# Patient Record
(demographics unavailable — no encounter records)

---

## 2025-06-04 NOTE — PHYSICAL EXAM
[2+] : left foot dorsalis pedis 2+ [de-identified] : No pain on palpation of extensor tendons R foot. No pain with MMT R foot. No pain with ROM Bunionette deformity B/L feet  [FreeTextEntry1] : Mild callus sub met 5 B/L

## 2025-06-04 NOTE — REASON FOR VISIT
[Initial Visit] : an initial visit for [FreeTextEntry2] : Right Foot Pain, Bunionette deformity B/L feet

## 2025-06-04 NOTE — HISTORY OF PRESENT ILLNESS
[FreeTextEntry1] : Right Foot Pain - Pain dorsal R foot for about 1 month - Constant, no improvement - No prior trauma  - No prior treatment   Kevin B/L feet  - Occasion pain - Wide shoes help  - Long standing  Practices yoga

## 2025-06-04 NOTE — ASSESSMENT
[FreeTextEntry1] : R Foot pain - unlikely tendinitis - recommend to loosen her shoe laces  - Possible arthritis - if confirmed on xray disused about injections with steroid and PRP  - rx Xray - will call with results Zoila faye - Discussed surgical management - patient is not ready  - Rx CMFO - patient wound benefit from CMFO to improve symptoms and functionality  - Rx Xray - will call with results RTO PRN or if pain does not improve within 4 weeks